# Patient Record
Sex: FEMALE | Race: BLACK OR AFRICAN AMERICAN | NOT HISPANIC OR LATINO | Employment: FULL TIME | URBAN - METROPOLITAN AREA
[De-identification: names, ages, dates, MRNs, and addresses within clinical notes are randomized per-mention and may not be internally consistent; named-entity substitution may affect disease eponyms.]

---

## 2023-01-19 ENCOUNTER — HOSPITAL ENCOUNTER (EMERGENCY)
Facility: HOSPITAL | Age: 53
Discharge: HOME | End: 2023-01-19
Attending: EMERGENCY MEDICINE | Admitting: EMERGENCY MEDICINE
Payer: COMMERCIAL

## 2023-01-19 ENCOUNTER — APPOINTMENT (OUTPATIENT)
Dept: CT IMAGING | Facility: HOSPITAL | Age: 53
End: 2023-01-19
Payer: COMMERCIAL

## 2023-01-19 VITALS
HEART RATE: 62 BPM | DIASTOLIC BLOOD PRESSURE: 88 MMHG | OXYGEN SATURATION: 97 % | RESPIRATION RATE: 16 BRPM | HEIGHT: 62 IN | SYSTOLIC BLOOD PRESSURE: 151 MMHG | BODY MASS INDEX: 29.44 KG/M2 | WEIGHT: 160 LBS | TEMPERATURE: 98.1 F

## 2023-01-19 DIAGNOSIS — N83.202 LEFT OVARIAN CYST: Primary | ICD-10-CM

## 2023-01-19 LAB
ANION GAP SERPL CALCULATED.3IONS-SCNC: 10.2 MMOL/L (ref 5–15)
BILIRUB UR QL STRIP: NEGATIVE
BUN SERPL-MCNC: 8 MG/DL (ref 6–20)
BUN/CREAT SERPL: 9.6 (ref 7–25)
CALCIUM SPEC-SCNC: 9.8 MG/DL (ref 8.6–10.5)
CHLORIDE SERPL-SCNC: 100 MMOL/L (ref 98–107)
CLARITY UR: CLEAR
CO2 SERPL-SCNC: 26.8 MMOL/L (ref 22–29)
COLOR UR: YELLOW
CREAT SERPL-MCNC: 0.83 MG/DL (ref 0.57–1)
DEPRECATED RDW RBC AUTO: 36.8 FL (ref 37–54)
EGFRCR SERPLBLD CKD-EPI 2021: 83.9 ML/MIN/1.73
ERYTHROCYTE [DISTWIDTH] IN BLOOD BY AUTOMATED COUNT: 12.6 % (ref 12.3–15.4)
GLUCOSE SERPL-MCNC: 92 MG/DL (ref 65–99)
GLUCOSE UR STRIP-MCNC: NEGATIVE MG/DL
HCT VFR BLD AUTO: 40.6 % (ref 34–46.6)
HGB BLD-MCNC: 14.5 G/DL (ref 12–15.9)
HGB UR QL STRIP.AUTO: NEGATIVE
KETONES UR QL STRIP: NEGATIVE
LEUKOCYTE ESTERASE UR QL STRIP.AUTO: NEGATIVE
MCH RBC QN AUTO: 28.4 PG (ref 26.6–33)
MCHC RBC AUTO-ENTMCNC: 35.7 G/DL (ref 31.5–35.7)
MCV RBC AUTO: 79.5 FL (ref 79–97)
NITRITE UR QL STRIP: NEGATIVE
PH UR STRIP.AUTO: 5.5 [PH] (ref 5–8)
PLATELET # BLD AUTO: 247 10*3/MM3 (ref 140–450)
PMV BLD AUTO: 10.2 FL (ref 6–12)
POTASSIUM SERPL-SCNC: 3.6 MMOL/L (ref 3.5–5.2)
PROT UR QL STRIP: NEGATIVE
RBC # BLD AUTO: 5.11 10*6/MM3 (ref 3.77–5.28)
SODIUM SERPL-SCNC: 137 MMOL/L (ref 136–145)
SP GR UR STRIP: 1.02 (ref 1–1.03)
UROBILINOGEN UR QL STRIP: NORMAL
WBC NRBC COR # BLD: 10.42 10*3/MM3 (ref 3.4–10.8)

## 2023-01-19 PROCEDURE — 85025 COMPLETE CBC W/AUTO DIFF WBC: CPT | Performed by: NURSE PRACTITIONER

## 2023-01-19 PROCEDURE — 81003 URINALYSIS AUTO W/O SCOPE: CPT | Performed by: EMERGENCY MEDICINE

## 2023-01-19 PROCEDURE — 85007 BL SMEAR W/DIFF WBC COUNT: CPT | Performed by: NURSE PRACTITIONER

## 2023-01-19 PROCEDURE — 99283 EMERGENCY DEPT VISIT LOW MDM: CPT | Performed by: NURSE PRACTITIONER

## 2023-01-19 PROCEDURE — 80048 BASIC METABOLIC PNL TOTAL CA: CPT | Performed by: NURSE PRACTITIONER

## 2023-01-19 PROCEDURE — 99283 EMERGENCY DEPT VISIT LOW MDM: CPT

## 2023-01-19 PROCEDURE — 74176 CT ABD & PELVIS W/O CONTRAST: CPT

## 2023-01-19 RX ORDER — HYDROCODONE BITARTRATE AND ACETAMINOPHEN 7.5; 325 MG/1; MG/1
1 TABLET ORAL EVERY 6 HOURS PRN
Qty: 10 TABLET | Refills: 0 | Status: SHIPPED | OUTPATIENT
Start: 2023-01-19

## 2023-01-19 RX ORDER — SODIUM CHLORIDE 0.9 % (FLUSH) 0.9 %
10 SYRINGE (ML) INJECTION AS NEEDED
Status: DISCONTINUED | OUTPATIENT
Start: 2023-01-19 | End: 2023-01-20 | Stop reason: HOSPADM

## 2023-01-19 RX ORDER — HYDROCHLOROTHIAZIDE 25 MG/1
25 TABLET ORAL DAILY
COMMUNITY

## 2023-01-19 RX ORDER — HYDROCODONE BITARTRATE AND ACETAMINOPHEN 7.5; 325 MG/1; MG/1
1 TABLET ORAL ONCE
Status: COMPLETED | OUTPATIENT
Start: 2023-01-19 | End: 2023-01-19

## 2023-01-19 RX ADMIN — HYDROCODONE BITARTRATE AND ACETAMINOPHEN 1 TABLET: 7.5; 325 TABLET ORAL at 22:08

## 2023-01-20 LAB
EOSINOPHIL # BLD MANUAL: 0.31 10*3/MM3 (ref 0–0.4)
EOSINOPHIL NFR BLD MANUAL: 3 % (ref 0.3–6.2)
LYMPHOCYTES # BLD MANUAL: 5.31 10*3/MM3 (ref 0.7–3.1)
LYMPHOCYTES NFR BLD MANUAL: 10 % (ref 5–12)
MONOCYTES # BLD: 1.04 10*3/MM3 (ref 0.1–0.9)
NEUTROPHILS # BLD AUTO: 3.75 10*3/MM3 (ref 1.7–7)
NEUTROPHILS NFR BLD MANUAL: 36 % (ref 42.7–76)
PLAT MORPH BLD: NORMAL
RBC MORPH BLD: NORMAL
VARIANT LYMPHS NFR BLD MANUAL: 51 % (ref 19.6–45.3)
WBC MORPH BLD: NORMAL

## 2023-01-20 NOTE — FSED PROVIDER NOTE
"EMERGENCY DEPARTMENT ENCOUNTER    Room Number:  10/10  Date seen:  1/19/2023  Time seen: 19:50 EST  PCP: Shayla Haney MD  Historian: pt,     HPI:  Chief complaint:\"I'm pretty sure I have a UTI\"  A complete HPI/ROS/PMH/PSH/SH/FH are unobtainable due to: n/a   Context:Lucia Fernando is a 52 y.o. female with history of UC and Crohns who presents to the ED with c/o 7-10 days of pressure in her lower abdomen with foul smelling and discolored urine.  She has associated urgency and frequency. Denies vomiting, fever/chills or blood in urine. She has not done anything at home for her symptoms.  States she probably doesn't drink enough water. She states this pain is not like her usual crohns or UC flare up.       The patient was placed in a mask in triage, hand hygiene was performed before and after my interaction with the patient.  I wore a mask, safety glasses and gloves during my entire interaction with the patient.    MEDICAL RECORD REVIEW    ALLERGIES  Patient has no known allergies.    PAST MEDICAL HISTORY  Active Ambulatory Problems     Diagnosis Date Noted   • No Active Ambulatory Problems     Resolved Ambulatory Problems     Diagnosis Date Noted   • No Resolved Ambulatory Problems     Past Medical History:   Diagnosis Date   • Colitis    • Crohn disease (HCC)    • Hypertension        PAST SURGICAL HISTORY  Past Surgical History:   Procedure Laterality Date   • COLONOSCOPY     • CYSTOSCOPY BLADDER STONE LITHOTRIPSY     • HYSTERECTOMY     • LAPAROSCOPIC TUBAL LIGATION         FAMILY HISTORY  No family history on file.    SOCIAL HISTORY  Social History     Socioeconomic History   • Marital status:        REVIEW OF SYSTEMS  Review of Systems    All systems reviewed and negative except for those discussed in HPI.     PHYSICAL EXAM    ED Triage Vitals [01/19/23 1914]   Temp Heart Rate Resp BP SpO2   98.1 °F (36.7 °C) 71 16 117/82 98 %      Temp src Heart Rate Source Patient Position BP Location FiO2 " (%)   Oral -- -- -- --     Physical Exam      I have reviewed the triage vital signs and nursing notes.      GENERAL: not distressed  HENT: nares patent  EYES: no scleral icterus  NECK: no ROM limitations  CV: regular rhythm, regular rate, no murmur  RESPIRATORY: normal effort, CTAB  ABDOMEN: soft, mild to moderate lower abdominal tenderness  : deferred  MUSCULOSKELETAL: no deformity  NEURO: alert, moves all extremities, follows commands  SKIN: warm, dry    LAB RESULTS  Recent Results (from the past 24 hour(s))   Urinalysis without microscopic (no culture) - Urine, Clean Catch    Collection Time: 01/19/23  7:51 PM    Specimen: Urine, Clean Catch   Result Value Ref Range    Color, UA Yellow Yellow, Straw    Appearance, UA Clear Clear    pH, UA 5.5 5.0 - 8.0    Specific Gravity, UA 1.025 1.005 - 1.030    Glucose, UA Negative Negative    Ketones, UA Negative Negative    Bilirubin, UA Negative Negative    Blood, UA Negative Negative    Protein, UA Negative Negative    Leuk Esterase, UA Negative Negative    Nitrite, UA Negative Negative    Urobilinogen, UA 0.2 E.U./dL 0.2 - 1.0 E.U./dL   Basic Metabolic Panel    Collection Time: 01/19/23  8:42 PM    Specimen: Blood   Result Value Ref Range    Glucose 92 65 - 99 mg/dL    BUN 8 6 - 20 mg/dL    Creatinine 0.83 0.57 - 1.00 mg/dL    Sodium 137 136 - 145 mmol/L    Potassium 3.6 3.5 - 5.2 mmol/L    Chloride 100 98 - 107 mmol/L    CO2 26.8 22.0 - 29.0 mmol/L    Calcium 9.8 8.6 - 10.5 mg/dL    BUN/Creatinine Ratio 9.6 7.0 - 25.0    Anion Gap 10.2 5.0 - 15.0 mmol/L    eGFR 83.9 >60.0 mL/min/1.73   CBC Auto Differential    Collection Time: 01/19/23  8:42 PM    Specimen: Blood   Result Value Ref Range    WBC 10.42 3.40 - 10.80 10*3/mm3    RBC 5.11 3.77 - 5.28 10*6/mm3    Hemoglobin 14.5 12.0 - 15.9 g/dL    Hematocrit 40.6 34.0 - 46.6 %    MCV 79.5 79.0 - 97.0 fL    MCH 28.4 26.6 - 33.0 pg    MCHC 35.7 31.5 - 35.7 g/dL    RDW 12.6 12.3 - 15.4 %    RDW-SD 36.8 (L) 37.0 - 54.0 fl     MPV 10.2 6.0 - 12.0 fL    Platelets 247 140 - 450 10*3/mm3         RADIOLOGY RESULTS  CT Abdomen Pelvis Without Contrast    Result Date: 1/19/2023  CT ABDOMEN PELVIS WO CONTRAST Date of Exam: 1/19/2023 8:43 PM EST Indication: abd pain, crohns,. Comparison: 4/24/2015 and prior Technique: Axial CT images were obtained of the abdomen and pelvis without the administration of contrast. Sagittal and coronal reconstructions were performed.  Automated exposure control and iterative reconstruction methods were used. FINDINGS: Abdomen/Pelvis: Lower Chest: Limited imaging of the lung bases is grossly clear. No free air noted below the diaphragm Organs: Limited noncontrast imaging of the liver, gallbladder, spleen, pancreas, kidneys and adrenal glands are unremarkable GI/Bowel: Limited noncontrast imaging of the stomach and small bowel are unremarkable. No suspicious mesenteric adenopathy or fluid collection The ileocecal valve is unremarkable. Patient appears to be status post hysterectomy. The colon shows no acute abnormality Pelvis: 4 cm cyst noted within the left ovary. Right ovary appears unremarkable. Patient is status post hysterectomy. The bladder is unremarkable Peritoneum/Retroperitoneum: The aorta is normal in caliber. There is no suspicious retroperitoneal adenopathy Bones/Soft Tissues: No destructive bone lesion.     4 cm cyst within the left ovary. If patient has pelvic pain follow-up pelvic ultrasound can always be considered Otherwise unremarkable limited noncontrast CT of the abdomen and pelvis Electronically Signed: Kush Romo  1/19/2023 9:38 PM EST  Workstation ID: [id number]         PROGRESS, DATA ANALYSIS, CONSULTS AND MEDICAL DECISION MAKING  All labs have been independently reviewed by me.  All radiology studies have been reviewed by me and discussed with radiologist dictating the report.  EKG's independently viewed and interpreted by me unless stated otherwise. Discussion below represents my analysis  of pertinent findings related to patient's condition, differential diagnosis, treatment plan and final disposition.       Social Determinants of Health     Financial Resource Strain: Not on file   Food Insecurity: Not on file   Transportation Needs: Not on file   Physical Activity: Not on file   Stress: Not on file   Social Connections: Not on file   Intimate Partner Violence: Not on file   Housing Stability: Not on file       Orders placed during this visit:  Orders Placed This Encounter   Procedures   • CT Abdomen Pelvis Without Contrast   • Urinalysis without microscopic (no culture) - Urine, Clean Catch   • Basic Metabolic Panel   • CBC Auto Differential   • Manual Differential   • Insert peripheral IV   • Blood Draw With IV Start   • CBC & Differential   • ED Acknowledgement Form Needed;     Differential diagnosis includes but is not limited to:  - hepatobiliary pathology such as cholecystitis, cholangitis, and symptomatic cholelithiasis  - Pancreatitis  - Dyspepsia  - Small bowel obstruction  - Appendicitis  - Diverticulitis  - UTI including pyelonephritis  - Ureteral stone  - Zoster  - Colitis, including infectious and ischemic  -crohns flare      ED Course as of 01/19/23 2146   Thu Jan 19, 2023 2029 Pt's urine does not appear infected.  Will  have her follow up with Primary Care Provider.  [EW]   2122 Pt's labs are unremarkable.  Urine not infected.  [EW]   2127 Due to her presence of pain and not feeling well for 10 days and urine not being abnormal I discussed getting CT scan to look at abdomen.  She has h/o crohns and UC.  [EW]   2145 Inspect Report verified.  [EW]      ED Course User Index  [EW] Margie Mandujano, JIM       Medical Decision Making  Pt's labs and urine are normal.  CT shows left ovarian cyst.  I have given her very short course of opiates to use as needed.  She will call her OB/GYN tomorrow.  She is not toxic appearing.     Left ovarian cyst: acute illness or injury  Amount and/or  Complexity of Data Reviewed  Labs: ordered.  Radiology: ordered.      Risk  Prescription drug management.          DIAGNOSIS  Final diagnoses:   Left ovarian cyst       FOLLOW-UP  Shayla Haney MD  [address] [facility]  [place] [address] [place] [address] [place] [facility]  [phone]      and call your OB/GYN tomorrow for sooner follow up        Latest Documented Vital Signs:  As of 21:46 EST  BP- 117/82 HR- 71 Temp- 98.1 °F (36.7 °C) (Oral) O2 sat- 98%    Please note that portions of this were completed with a voice recognition program.     Note Disclaimer: At [facility], we believe that sharing information builds trust and better relationships. You are receiving this note because you are receiving care at [facility] or recently visited. It is possible you will see health information before a provider has talked with you about it. This kind of information can be easy to misunderstand. To help you fully understand what it means for your health, we urge you to discuss this note with your provider.

## 2023-01-20 NOTE — DISCHARGE INSTRUCTIONS
YOU HAVE BEEN PRESCRIBED NARCOTIC PAIN MEDICATION    Narcotic pain medications can be addicting; only take them when needed    You have only been given a 2-3 day supply    Do not operate heavy machinery or make important decisions while taking narcotics    Take an over the counter stool softener while taking pain pills to avoid constipation    Do not consume alcohol while taking pain medication as this can be fatal    Do not share your pain medication with others    Store pain medication securely to prevent accidental exposure or death    Return Precautions    Although you are being discharged from the ED today, I encourage you to return for worsening symptoms.  Things can, and do, change such that treatment at home with medication may not be adequate.      Specifically, return for any of the following: WORSENING PAIN, NAUSEA AND VOMITING    Chest pain, shortness of breath, pain or nausea and vomiting not controlled by medications provided.    Please make a follow up with your Primary Care Provider for a blood pressure recheck.

## 2023-05-31 ENCOUNTER — APPOINTMENT (OUTPATIENT)
Dept: GENERAL RADIOLOGY | Facility: HOSPITAL | Age: 53
End: 2023-05-31
Payer: COMMERCIAL

## 2023-05-31 ENCOUNTER — HOSPITAL ENCOUNTER (OUTPATIENT)
Facility: HOSPITAL | Age: 53
Setting detail: OBSERVATION
Discharge: HOME | End: 2023-06-01
Attending: EMERGENCY MEDICINE | Admitting: EMERGENCY MEDICINE
Payer: COMMERCIAL

## 2023-05-31 DIAGNOSIS — R55 NEAR SYNCOPE: ICD-10-CM

## 2023-05-31 DIAGNOSIS — T50.901A ACCIDENTAL DRUG INGESTION, INITIAL ENCOUNTER: Primary | ICD-10-CM

## 2023-05-31 LAB
ALBUMIN SERPL-MCNC: 4 G/DL (ref 3.5–5.2)
ALBUMIN/GLOB SERPL: 1.3 G/DL
ALP SERPL-CCNC: 62 U/L (ref 39–117)
ALT SERPL W P-5'-P-CCNC: 14 U/L (ref 1–33)
ANION GAP SERPL CALCULATED.3IONS-SCNC: 15 MMOL/L (ref 5–15)
AST SERPL-CCNC: 17 U/L (ref 1–32)
BASOPHILS # BLD AUTO: 0.1 10*3/MM3 (ref 0–0.2)
BASOPHILS NFR BLD AUTO: 0.9 % (ref 0–1.5)
BILIRUB SERPL-MCNC: 0.6 MG/DL (ref 0–1.2)
BUN SERPL-MCNC: 12 MG/DL (ref 6–20)
BUN/CREAT SERPL: 13.3 (ref 7–25)
CALCIUM SPEC-SCNC: 9.8 MG/DL (ref 8.6–10.5)
CHLORIDE SERPL-SCNC: 103 MMOL/L (ref 98–107)
CO2 SERPL-SCNC: 25 MMOL/L (ref 22–29)
CREAT SERPL-MCNC: 0.9 MG/DL (ref 0.57–1)
DEPRECATED RDW RBC AUTO: 42 FL (ref 37–54)
EGFRCR SERPLBLD CKD-EPI 2021: 75.7 ML/MIN/1.73
EOSINOPHIL # BLD AUTO: 0.1 10*3/MM3 (ref 0–0.4)
EOSINOPHIL NFR BLD AUTO: 0.4 % (ref 0.3–6.2)
ERYTHROCYTE [DISTWIDTH] IN BLOOD BY AUTOMATED COUNT: 13.9 % (ref 12.3–15.4)
GIANT PLATELETS: NORMAL
GLOBULIN UR ELPH-MCNC: 3.2 GM/DL
GLUCOSE SERPL-MCNC: 133 MG/DL (ref 65–99)
HCT VFR BLD AUTO: 41.8 % (ref 34–46.6)
HGB BLD-MCNC: 14.5 G/DL (ref 12–15.9)
HOLD SPECIMEN: NORMAL
HOLD SPECIMEN: NORMAL
LYMPHOCYTES # BLD AUTO: 5.3 10*3/MM3 (ref 0.7–3.1)
LYMPHOCYTES NFR BLD AUTO: 38.5 % (ref 19.6–45.3)
MCH RBC QN AUTO: 28.4 PG (ref 26.6–33)
MCHC RBC AUTO-ENTMCNC: 34.6 G/DL (ref 31.5–35.7)
MCV RBC AUTO: 82.3 FL (ref 79–97)
MONOCYTES # BLD AUTO: 0.9 10*3/MM3 (ref 0.1–0.9)
MONOCYTES NFR BLD AUTO: 6.3 % (ref 5–12)
NEUTROPHILS NFR BLD AUTO: 53.9 % (ref 42.7–76)
NEUTROPHILS NFR BLD AUTO: 7.5 10*3/MM3 (ref 1.7–7)
NRBC BLD AUTO-RTO: 0.4 /100 WBC (ref 0–0.2)
PLATELET # BLD AUTO: 175 10*3/MM3 (ref 140–450)
PMV BLD AUTO: 9.7 FL (ref 6–12)
POTASSIUM SERPL-SCNC: 3.1 MMOL/L (ref 3.5–5.2)
PROT SERPL-MCNC: 7.2 G/DL (ref 6–8.5)
RBC # BLD AUTO: 5.09 10*6/MM3 (ref 3.77–5.28)
RBC MORPH BLD: NORMAL
SMALL PLATELETS BLD QL SMEAR: ADEQUATE
SODIUM SERPL-SCNC: 143 MMOL/L (ref 136–145)
WBC MORPH BLD: NORMAL
WBC NRBC COR # BLD: 13.9 10*3/MM3 (ref 3.4–10.8)
WHOLE BLOOD HOLD COAG: NORMAL
WHOLE BLOOD HOLD SPECIMEN: NORMAL

## 2023-05-31 PROCEDURE — 96374 THER/PROPH/DIAG INJ IV PUSH: CPT

## 2023-05-31 PROCEDURE — G0378 HOSPITAL OBSERVATION PER HR: HCPCS

## 2023-05-31 PROCEDURE — 85007 BL SMEAR W/DIFF WBC COUNT: CPT | Performed by: NURSE PRACTITIONER

## 2023-05-31 PROCEDURE — 71045 X-RAY EXAM CHEST 1 VIEW: CPT

## 2023-05-31 PROCEDURE — 36415 COLL VENOUS BLD VENIPUNCTURE: CPT

## 2023-05-31 PROCEDURE — 99285 EMERGENCY DEPT VISIT HI MDM: CPT

## 2023-05-31 PROCEDURE — 25010000002 ONDANSETRON PER 1 MG: Performed by: NURSE PRACTITIONER

## 2023-05-31 PROCEDURE — 93005 ELECTROCARDIOGRAM TRACING: CPT | Performed by: EMERGENCY MEDICINE

## 2023-05-31 PROCEDURE — 85025 COMPLETE CBC W/AUTO DIFF WBC: CPT | Performed by: NURSE PRACTITIONER

## 2023-05-31 PROCEDURE — 80053 COMPREHEN METABOLIC PANEL: CPT | Performed by: NURSE PRACTITIONER

## 2023-05-31 RX ORDER — ONDANSETRON 2 MG/ML
4 INJECTION INTRAMUSCULAR; INTRAVENOUS ONCE
Status: COMPLETED | OUTPATIENT
Start: 2023-05-31 | End: 2023-05-31

## 2023-05-31 RX ORDER — SODIUM CHLORIDE 0.9 % (FLUSH) 0.9 %
10 SYRINGE (ML) INJECTION AS NEEDED
Status: DISCONTINUED | OUTPATIENT
Start: 2023-05-31 | End: 2023-06-01 | Stop reason: HOSPADM

## 2023-05-31 RX ADMIN — SODIUM CHLORIDE, POTASSIUM CHLORIDE, SODIUM LACTATE AND CALCIUM CHLORIDE 1000 ML: 600; 310; 30; 20 INJECTION, SOLUTION INTRAVENOUS at 19:26

## 2023-05-31 RX ADMIN — ONDANSETRON 4 MG: 2 INJECTION INTRAMUSCULAR; INTRAVENOUS at 19:38

## 2023-05-31 NOTE — ED PROVIDER NOTES
"Subjective   History of Present Illness  53-year-old female presents with spouse and mother at bedside for complaint of near syncope after taking her hydrochlorothiazide as well as her 's lisinopril and hydralazine by accident.  Patient stated \"he said his medicine bottles next to mine and I took them thinking they remind\".  She reports that she does not have relief with lying flat.  She denies history of heart failure.  Denies associated chest pain dyspnea.    Primary care: Dr. Haney        Review of Systems    Past Medical History:   Diagnosis Date   • Colitis    • Crohn disease    • Hypertension        No Known Allergies    Past Surgical History:   Procedure Laterality Date   • COLONOSCOPY     • CYSTOSCOPY BLADDER STONE LITHOTRIPSY     • HYSTERECTOMY     • LAPAROSCOPIC TUBAL LIGATION         History reviewed. No pertinent family history.    Social History     Socioeconomic History   • Marital status:            Objective   Physical Exam  Vitals and nursing note reviewed.   Constitutional:       General: She is awake. She is not in acute distress.     Appearance: Normal appearance. She is well-developed and normal weight. She is not ill-appearing, toxic-appearing or diaphoretic.   HENT:      Head: Normocephalic and atraumatic.   Neck:      Thyroid: No thyromegaly.      Vascular: No JVD.      Trachea: No tracheal deviation.   Cardiovascular:      Rate and Rhythm: Regular rhythm. Tachycardia present.      Pulses: Normal pulses.           Radial pulses are 2+ on the right side and 2+ on the left side.        Dorsalis pedis pulses are 2+ on the right side and 2+ on the left side.        Posterior tibial pulses are 2+ on the right side and 2+ on the left side.      Heart sounds: Normal heart sounds, S1 normal and S2 normal. Heart sounds not distant. No murmur heard.    No friction rub. No gallop.   Pulmonary:      Effort: Pulmonary effort is normal. No respiratory distress.      Breath sounds: Normal " breath sounds and air entry. No wheezing or rales.   Chest:      Chest wall: No tenderness.   Abdominal:      General: Bowel sounds are normal. There is no distension.      Palpations: Abdomen is soft. Abdomen is not rigid. There is no mass.      Tenderness: There is no abdominal tenderness. There is no guarding or rebound.      Hernia: No hernia is present.   Musculoskeletal:         General: No swelling or tenderness. Normal range of motion.      Cervical back: Normal range of motion and neck supple.      Right lower leg: No edema.      Left lower leg: No edema.   Skin:     General: Skin is warm and dry.      Capillary Refill: Capillary refill takes less than 2 seconds.      Coloration: Skin is pale.      Findings: No bruising or rash.   Neurological:      Mental Status: She is alert and oriented to person, place, and time.      GCS: GCS eye subscore is 4. GCS verbal subscore is 5. GCS motor subscore is 6.   Psychiatric:         Mood and Affect: Mood normal.         Behavior: Behavior normal. Behavior is cooperative.         Thought Content: Thought content normal.         Judgment: Judgment normal.         ECG 12 Lead      Date/Time: 5/31/2023 9:23 PM  Performed by: Xiao Grider APRN  Authorized by: Harpal Hernandez MD   Interpreted by physician  Comparison: compared with previous ECG from 11/16/2015  Similar to previous ECG  Comparison to previous ECG: Sinus rhythm rate 58  Rhythm: sinus rhythm  Rhythm comments: Rate 85  ST Segments: ST segments normal  T Waves: T waves normal  Other findings: prolonged QTc interval  Other findings comments: 524  Clinical impression: abnormal ECG  Comments: Rate increased, prolonged QT interval now present                 ED Course                                           Medical Decision Making  Accidental drug ingestion, initial encounter: acute illness or injury  Near syncope: acute illness or injury  Amount and/or Complexity of Data Reviewed  Labs: ordered.  "Decision-making details documented in ED Course.  Radiology: ordered. Decision-making details documented in ED Course.  ECG/medicine tests: ordered. Decision-making details documented in ED Course.      Risk  Prescription drug management.  Decision regarding hospitalization.        Interpreted by radiologist as below:     XR Chest 1 View    Result Date: 5/31/2023  1.Left basilar opacities may represent atelectasis or infiltrates. Electronically Signed: Govind Blackwood  5/31/2023 8:22 PM EDT  Workstation ID: [id number]        /69 (Patient Position: Standing)   Pulse 100   Temp 98.1 °F (36.7 °C) (Oral)   Resp 16   Ht 157.5 cm (62\")   Wt 72.6 kg (160 lb)   SpO2 99%   BMI 29.26 kg/m²      Lab Results (last 24 hours)     Procedure Component Value Units Date/Time    CBC & Differential [[id number]]  (Abnormal) Collected: 05/31/23 1853    Specimen: Blood from Arm, Left Updated: 05/31/23 2038    Narrative:      The following orders were created for panel order CBC & Differential.  Procedure                               Abnormality         Status                     ---------                               -----------         ------                     CBC Auto Differential[[id number]]        Abnormal            Final result               Scan Slide[[id number]]                                       Final result                 Please view results for these tests on the individual orders.    Comprehensive Metabolic Panel [[id number]]  (Abnormal) Collected: 05/31/23 1853    Specimen: Blood from Arm, Left Updated: 05/31/23 1945     Glucose 133 mg/dL      BUN 12 mg/dL      Creatinine 0.90 mg/dL      Sodium 143 mmol/L      Potassium 3.1 mmol/L      Chloride 103 mmol/L      CO2 25.0 mmol/L      Calcium 9.8 mg/dL      Total Protein 7.2 g/dL      Albumin 4.0 g/dL      ALT (SGPT) 14 U/L      AST (SGOT) 17 U/L      Alkaline Phosphatase 62 U/L      Total Bilirubin 0.6 mg/dL      Globulin 3.2 gm/dL      A/G Ratio 1.3 g/dL      " BUN/Creatinine Ratio 13.3     Anion Gap 15.0 mmol/L      eGFR 75.7 mL/min/1.73     Narrative:      GFR Normal >60  Chronic Kidney Disease <60  Kidney Failure <15      CBC Auto Differential [[id number]]  (Abnormal) Collected: 05/31/23 1853    Specimen: Blood from Arm, Left Updated: 05/31/23 2038     WBC 13.90 10*3/mm3      RBC 5.09 10*6/mm3      Hemoglobin 14.5 g/dL      Hematocrit 41.8 %      MCV 82.3 fL      MCH 28.4 pg      MCHC 34.6 g/dL      RDW 13.9 %      RDW-SD 42.0 fl      MPV 9.7 fL      Platelets 175 10*3/mm3      Neutrophil % 53.9 %      Lymphocyte % 38.5 %      Monocyte % 6.3 %      Eosinophil % 0.4 %      Basophil % 0.9 %      Neutrophils, Absolute 7.50 10*3/mm3      Lymphocytes, Absolute 5.30 10*3/mm3      Monocytes, Absolute 0.90 10*3/mm3      Eosinophils, Absolute 0.10 10*3/mm3      Basophils, Absolute 0.10 10*3/mm3      nRBC 0.4 /100 WBC     Scan Slide [[id number]] Collected: 05/31/23 1853    Specimen: Blood from Arm, Left Updated: 05/31/23 2038     RBC Morphology Normal     WBC Morphology Normal     Platelet Estimate Adequate     Giant Platelets Mod/2+           Medications   sodium chloride 0.9 % flush 10 mL (has no administration in time range)   lactated ringers bolus 1,000 mL (1,000 mL Intravenous New Bag 5/31/23 1926)   ondansetron (ZOFRAN) injection 4 mg (4 mg Intravenous Given 5/31/23 1938)        Patient undressed and placed in gown for exam.  Appropriate PPE worn during patient exam.  Appropriate monitoring initiated. Patient is alert and oriented x3. No acute distress.  S1-S2 heart sounds on exam.  Lungs are clear to auscultation. No edema noted to the bilateral lower extremities.  IV established and labs obtained.  Cardiac work-up initiated.  Chest x-ray obtained with the above findings. Patient had near syncope and nausea with standing while doing orthostatic vital signs.  Patient was given a lactated Ringer's 1 L bolus.  Her pressure improved from 89 systolic to 109.  CBC CMP essentially  unremarkable.  Chest x-ray shows no acute abnormalities, left basilar atelectasis noted.  There is a concern because she had taken her 's lisinopril which has a half-life of 12 hours.  She ingested the medication at 1630 spoke with my attending, Dr. Hernandez who is agreeable that patient should be placed in ED observation for monitoring blood pressure and IV hydration.    I reviewed chart 1/19/2023 patient was seen at [facility] and found to have a left ovarian cyst. My radiology interpretation left basilar atelectasis, otherwise no cardiomegaly, pulmonary edema, infiltrates    Disposition/Treatment: Discussed results with patient, verbalized understanding. Agreeable with plan of care. Patient was stable upon being placed in ED observation unit.    Upon reassessment, patient is flesh tone warm and dry no acute distress noted.  Vital signs are stable.    Part of this note may be an electronic transcription/translation of spoken language to printed text using the Dragon Dictation System.     Final diagnoses:   Accidental drug ingestion, initial encounter   Near syncope       ED Disposition  ED Disposition     ED Disposition   Decision to Admit    Condition   --    Comment   --             No follow-up provider specified.       Medication List      No changes were made to your prescriptions during this visit.          Xiao Grider, APRN  05/31/23 [id number]

## 2023-05-31 NOTE — ED NOTES
"Pt reports accidentally taking her 's blood pressure medication(s). He takes Hydralazine 100 mg and Lisinopril 40 mg. She is unsure if she took one or both of the pills. Pt reports taking these at approx 1730. Pt started feeling very lightheaded and dizzy 30 min's ago and realized what she had done. EMS was called and pt brought to ED. Pt denies any dizziness or lightheadedness, but feels a bit \"spacey.\" Pt and family laughing at the incident since pt is asymptomatic other than unusually low b/p.   "

## 2023-06-01 VITALS
HEIGHT: 62 IN | WEIGHT: 160 LBS | SYSTOLIC BLOOD PRESSURE: 114 MMHG | BODY MASS INDEX: 29.44 KG/M2 | RESPIRATION RATE: 16 BRPM | DIASTOLIC BLOOD PRESSURE: 76 MMHG | OXYGEN SATURATION: 96 % | HEART RATE: 102 BPM | TEMPERATURE: 98.6 F

## 2023-06-01 LAB
ANION GAP SERPL CALCULATED.3IONS-SCNC: 11 MMOL/L (ref 5–15)
BASOPHILS # BLD AUTO: 0 10*3/MM3 (ref 0–0.2)
BASOPHILS NFR BLD AUTO: 0.3 % (ref 0–1.5)
BUN SERPL-MCNC: 10 MG/DL (ref 6–20)
BUN/CREAT SERPL: 13.9 (ref 7–25)
CALCIUM SPEC-SCNC: 9.5 MG/DL (ref 8.6–10.5)
CHLORIDE SERPL-SCNC: 105 MMOL/L (ref 98–107)
CO2 SERPL-SCNC: 25 MMOL/L (ref 22–29)
CREAT SERPL-MCNC: 0.72 MG/DL (ref 0.57–1)
DEPRECATED RDW RBC AUTO: 42.4 FL (ref 37–54)
EGFRCR SERPLBLD CKD-EPI 2021: 98.9 ML/MIN/1.73
EOSINOPHIL # BLD AUTO: 0 10*3/MM3 (ref 0–0.4)
EOSINOPHIL NFR BLD AUTO: 0.1 % (ref 0.3–6.2)
ERYTHROCYTE [DISTWIDTH] IN BLOOD BY AUTOMATED COUNT: 14 % (ref 12.3–15.4)
GLUCOSE SERPL-MCNC: 116 MG/DL (ref 65–99)
HCT VFR BLD AUTO: 39.2 % (ref 34–46.6)
HGB BLD-MCNC: 13.7 G/DL (ref 12–15.9)
LYMPHOCYTES # BLD AUTO: 3.2 10*3/MM3 (ref 0.7–3.1)
LYMPHOCYTES NFR BLD AUTO: 28 % (ref 19.6–45.3)
MCH RBC QN AUTO: 28.6 PG (ref 26.6–33)
MCHC RBC AUTO-ENTMCNC: 34.9 G/DL (ref 31.5–35.7)
MCV RBC AUTO: 81.9 FL (ref 79–97)
MONOCYTES # BLD AUTO: 0.9 10*3/MM3 (ref 0.1–0.9)
MONOCYTES NFR BLD AUTO: 7.5 % (ref 5–12)
NEUTROPHILS NFR BLD AUTO: 64.1 % (ref 42.7–76)
NEUTROPHILS NFR BLD AUTO: 7.2 10*3/MM3 (ref 1.7–7)
NRBC BLD AUTO-RTO: 0.4 /100 WBC (ref 0–0.2)
PLATELET # BLD AUTO: 219 10*3/MM3 (ref 140–450)
PMV BLD AUTO: 9 FL (ref 6–12)
POTASSIUM SERPL-SCNC: 3.6 MMOL/L (ref 3.5–5.2)
QT INTERVAL: 440 MS
RBC # BLD AUTO: 4.79 10*6/MM3 (ref 3.77–5.28)
SODIUM SERPL-SCNC: 141 MMOL/L (ref 136–145)
WBC NRBC COR # BLD: 11.3 10*3/MM3 (ref 3.4–10.8)

## 2023-06-01 PROCEDURE — G0378 HOSPITAL OBSERVATION PER HR: HCPCS

## 2023-06-01 PROCEDURE — 85025 COMPLETE CBC W/AUTO DIFF WBC: CPT | Performed by: EMERGENCY MEDICINE

## 2023-06-01 PROCEDURE — 93005 ELECTROCARDIOGRAM TRACING: CPT | Performed by: PHYSICIAN ASSISTANT

## 2023-06-01 PROCEDURE — 93010 ELECTROCARDIOGRAM REPORT: CPT | Performed by: INTERNAL MEDICINE

## 2023-06-01 PROCEDURE — 80048 BASIC METABOLIC PNL TOTAL CA: CPT | Performed by: EMERGENCY MEDICINE

## 2023-06-01 RX ORDER — SUMATRIPTAN 25 MG/1
25 TABLET, FILM COATED ORAL ONCE
Status: COMPLETED | OUTPATIENT
Start: 2023-06-01 | End: 2023-06-01

## 2023-06-01 RX ORDER — DESVENLAFAXINE SUCCINATE 50 MG/1
50 TABLET, EXTENDED RELEASE ORAL DAILY
Status: DISCONTINUED | OUTPATIENT
Start: 2023-06-01 | End: 2023-06-01 | Stop reason: HOSPADM

## 2023-06-01 RX ORDER — AMITRIPTYLINE HYDROCHLORIDE 10 MG/1
10 TABLET, FILM COATED ORAL NIGHTLY
COMMUNITY

## 2023-06-01 RX ORDER — AMITRIPTYLINE HYDROCHLORIDE 10 MG/1
10 TABLET, FILM COATED ORAL ONCE
Status: COMPLETED | OUTPATIENT
Start: 2023-06-01 | End: 2023-06-01

## 2023-06-01 RX ORDER — SUMATRIPTAN 25 MG/1
25 TABLET, FILM COATED ORAL
COMMUNITY

## 2023-06-01 RX ORDER — POTASSIUM CHLORIDE 750 MG/1
10 TABLET, FILM COATED, EXTENDED RELEASE ORAL 2 TIMES DAILY
COMMUNITY

## 2023-06-01 RX ORDER — BUTALBITAL, ACETAMINOPHEN AND CAFFEINE 50; 325; 40 MG/1; MG/1; MG/1
1 TABLET ORAL EVERY 4 HOURS PRN
Status: DISCONTINUED | OUTPATIENT
Start: 2023-06-01 | End: 2023-06-01 | Stop reason: HOSPADM

## 2023-06-01 RX ORDER — DESVENLAFAXINE SUCCINATE 50 MG/1
50 TABLET, EXTENDED RELEASE ORAL DAILY
COMMUNITY

## 2023-06-01 RX ORDER — POTASSIUM CHLORIDE 750 MG/1
10 TABLET, FILM COATED, EXTENDED RELEASE ORAL ONCE
Status: COMPLETED | OUTPATIENT
Start: 2023-06-01 | End: 2023-06-01

## 2023-06-01 RX ORDER — POTASSIUM CHLORIDE 750 MG/1
10 TABLET, FILM COATED, EXTENDED RELEASE ORAL 2 TIMES DAILY WITH MEALS
Status: DISCONTINUED | OUTPATIENT
Start: 2023-06-01 | End: 2023-06-01

## 2023-06-01 RX ORDER — POTASSIUM CHLORIDE 20 MEQ/1
20 TABLET, EXTENDED RELEASE ORAL 2 TIMES DAILY WITH MEALS
Status: DISCONTINUED | OUTPATIENT
Start: 2023-06-01 | End: 2023-06-01 | Stop reason: HOSPADM

## 2023-06-01 RX ORDER — AMITRIPTYLINE HYDROCHLORIDE 10 MG/1
10 TABLET, FILM COATED ORAL NIGHTLY
Status: DISCONTINUED | OUTPATIENT
Start: 2023-06-01 | End: 2023-06-01 | Stop reason: HOSPADM

## 2023-06-01 RX ORDER — VIBEGRON 75 MG/1
75 TABLET, FILM COATED ORAL DAILY
COMMUNITY

## 2023-06-01 RX ADMIN — BUTALBITAL, ACETAMINOPHEN, AND CAFFEINE 1 TABLET: 50; 325; 40 TABLET ORAL at 07:00

## 2023-06-01 RX ADMIN — AMITRIPTYLINE HYDROCHLORIDE 10 MG: 10 TABLET, FILM COATED ORAL at 01:29

## 2023-06-01 RX ADMIN — POTASSIUM CHLORIDE 10 MEQ: 750 TABLET, EXTENDED RELEASE ORAL at 02:02

## 2023-06-01 RX ADMIN — Medication 10 ML: at 01:30

## 2023-06-01 RX ADMIN — POTASSIUM CHLORIDE 20 MEQ: 1500 TABLET, EXTENDED RELEASE ORAL at 08:15

## 2023-06-01 RX ADMIN — DESVENLAFAXINE SUCCINATE 50 MG: 50 TABLET, EXTENDED RELEASE ORAL at 08:15

## 2023-06-01 RX ADMIN — POTASSIUM CHLORIDE 10 MEQ: 750 TABLET, EXTENDED RELEASE ORAL at 01:29

## 2023-06-01 RX ADMIN — SUMATRIPTAN SUCCINATE 25 MG: 25 TABLET ORAL at 01:29

## 2023-06-01 NOTE — DISCHARGE SUMMARY
"[facility]    Shayla Haney MD    CHIEF COMPLAINT:     Accidental overdose    HISTORY OF PRESENT ILLNESS:    Obtained from ED provider HPI on 5/31/2023:  53-year-old female presents with spouse and mother at bedside for complaint of near syncope after taking her hydrochlorothiazide as well as her 's lisinopril and hydralazine by accident.  Patient stated \"he said his medicine bottles next to mine and I took them thinking they remind\".  She reports that she does not have relief with lying flat.  She denies history of heart failure.  Denies associated chest pain dyspnea.    06/01/23:  Patient confirms HPI noted above including accidentally taking the wrong blood pressure medication with some subsequent lightheadedness and hypotension noted in the ED.  She has done generally well following her admission though she does report a headache on the morning following.  No other new or acute complaints are noted          Past Medical History:   Diagnosis Date   • Colitis    • Crohn disease    • Hypertension      Past Surgical History:   Procedure Laterality Date   • COLONOSCOPY     • CYSTOSCOPY BLADDER STONE LITHOTRIPSY     • HYSTERECTOMY     • LAPAROSCOPIC TUBAL LIGATION       History reviewed. No pertinent family history.  Social History     Tobacco Use   • Smoking status: Former     Packs/day: 1.00     Years: 15.00     Pack years: 15.00     Types: Cigarettes   • Smokeless tobacco: Never   Vaping Use   • Vaping Use: Unknown   Substance Use Topics   • Alcohol use: Not Currently   • Drug use: Defer     Medications Prior to Admission   Medication Sig Dispense Refill Last Dose   • amitriptyline (ELAVIL) 10 MG tablet Take 1 tablet by mouth Every Night.      • desvenlafaxine (PRISTIQ) 50 MG 24 hr tablet Take 1 tablet by mouth Daily.      • hydroCHLOROthiazide (HYDRODIURIL) 25 MG tablet Take 25 mg by mouth Daily.      • HYDROcodone-acetaminophen (NORCO) 7.5-325 MG per tablet Take 1 tablet by " mouth Every 6 (Six) Hours As Needed for Moderate Pain. 10 tablet 0    • potassium chloride 10 MEQ CR tablet Take 1 tablet by mouth 2 (Two) Times a Day.      • SUMAtriptan (IMITREX) 25 MG tablet Take 1 tablet by mouth Every 2 (Two) Hours As Needed for Migraine. Take one tablet at onset of headache. May repeat dose one time in 2 hours if headache not relieved.      • Vibegron (Gemtesa) 75 MG tablet Take 1 tablet by mouth Daily.        Allergies:  Patient has no known allergies.    Immunization History   Administered Date(s) Administered   • COVID-19 ([employer]) 03/13/2021           REVIEW OF SYSTEMS:    Review of Systems   Constitutional: Negative.   HENT: Negative.    Eyes: Negative.    Cardiovascular: Positive for near-syncope.   Respiratory: Negative.    Skin: Negative.    Musculoskeletal: Negative.    Gastrointestinal: Negative.    Genitourinary: Negative.    Neurological: Positive for dizziness.   Psychiatric/Behavioral: Negative.          Vital Signs  Temp:  [98.1 °F (36.7 °C)-99.3 °F (37.4 °C)] 98.6 °F (37 °C)  Heart Rate:  [] 102  Resp:  [15-16] 16  BP: ()/(43-78) 114/76          Physical Exam:  Physical Exam  Vitals reviewed.   Constitutional:       General: She is not in acute distress.     Appearance: Normal appearance. She is normal weight. She is not ill-appearing, toxic-appearing or diaphoretic.   HENT:      Head: Normocephalic.      Right Ear: External ear normal.      Left Ear: External ear normal.      Nose: Nose normal.      Mouth/Throat:      Mouth: Mucous membranes are moist.   Eyes:      Extraocular Movements: Extraocular movements intact.   Cardiovascular:      Rate and Rhythm: Normal rate and regular rhythm.      Pulses: Normal pulses.   Pulmonary:      Effort: Pulmonary effort is normal.      Breath sounds: Normal breath sounds.   Abdominal:      General: Bowel sounds are normal.      Palpations: Abdomen is soft.   Musculoskeletal:      Cervical back: Normal range of motion.       Right lower leg: No edema.      Left lower leg: No edema.   Skin:     General: Skin is warm and dry.      Capillary Refill: Capillary refill takes less than 2 seconds.   Neurological:      General: No focal deficit present.      Mental Status: She is alert.   Psychiatric:         Mood and Affect: Mood normal.         Behavior: Behavior normal.         Thought Content: Thought content normal.         Judgment: Judgment normal.           Emotional Behavior:   Normal   Debilities:  None  Results Review:    I reviewed the patient's new clinical results.  Lab Results (most recent)     Procedure Component Value Units Date/Time    Basic Metabolic Panel [[id number]]  (Abnormal) Collected: 06/01/23 0728    Specimen: Blood from Arm, Left Updated: 06/01/23 0821     Glucose 116 mg/dL      BUN 10 mg/dL      Creatinine 0.72 mg/dL      Sodium 141 mmol/L      Potassium 3.6 mmol/L      Chloride 105 mmol/L      CO2 25.0 mmol/L      Calcium 9.5 mg/dL      BUN/Creatinine Ratio 13.9     Anion Gap 11.0 mmol/L      eGFR 98.9 mL/min/1.73     Narrative:      GFR Normal >60  Chronic Kidney Disease <60  Kidney Failure <15      CBC & Differential [[id number]]  (Abnormal) Collected: 06/01/23 0728    Specimen: Blood from Arm, Left Updated: 06/01/23 0800    Narrative:      The following orders were created for panel order CBC & Differential.  Procedure                               Abnormality         Status                     ---------                               -----------         ------                     CBC Auto Differential[[id number]]        Abnormal            Final result                 Please view results for these tests on the individual orders.    CBC Auto Differential [[id number]]  (Abnormal) Collected: 06/01/23 0728    Specimen: Blood from Arm, Left Updated: 06/01/23 0800     WBC 11.30 10*3/mm3      RBC 4.79 10*6/mm3      Hemoglobin 13.7 g/dL      Hematocrit 39.2 %      MCV 81.9 fL      MCH 28.6 pg      MCHC 34.9 g/dL      RDW  14.0 %      RDW-SD 42.4 fl      MPV 9.0 fL      Platelets 219 10*3/mm3      Neutrophil % 64.1 %      Lymphocyte % 28.0 %      Monocyte % 7.5 %      Eosinophil % 0.1 %      Basophil % 0.3 %      Neutrophils, Absolute 7.20 10*3/mm3      Lymphocytes, Absolute 3.20 10*3/mm3      Monocytes, Absolute 0.90 10*3/mm3      Eosinophils, Absolute 0.00 10*3/mm3      Basophils, Absolute 0.00 10*3/mm3      nRBC 0.4 /100 WBC     CBC & Differential [[id number]]  (Abnormal) Collected: 05/31/23 1853    Specimen: Blood from Arm, Left Updated: 05/31/23 2038    Narrative:      The following orders were created for panel order CBC & Differential.  Procedure                               Abnormality         Status                     ---------                               -----------         ------                     CBC Auto Differential[[id number]]        Abnormal            Final result               Scan Slide[[id number]]                                       Final result                 Please view results for these tests on the individual orders.    Scan Slide [[id number]] Collected: 05/31/23 1853    Specimen: Blood from Arm, Left Updated: 05/31/23 2038     RBC Morphology Normal     WBC Morphology Normal     Platelet Estimate Adequate     Giant Platelets Mod/2+    CBC Auto Differential [[id number]]  (Abnormal) Collected: 05/31/23 1853    Specimen: Blood from Arm, Left Updated: 05/31/23 2038     WBC 13.90 10*3/mm3      RBC 5.09 10*6/mm3      Hemoglobin 14.5 g/dL      Hematocrit 41.8 %      MCV 82.3 fL      MCH 28.4 pg      MCHC 34.6 g/dL      RDW 13.9 %      RDW-SD 42.0 fl      MPV 9.7 fL      Platelets 175 10*3/mm3      Neutrophil % 53.9 %      Lymphocyte % 38.5 %      Monocyte % 6.3 %      Eosinophil % 0.4 %      Basophil % 0.9 %      Neutrophils, Absolute 7.50 10*3/mm3      Lymphocytes, Absolute 5.30 10*3/mm3      Monocytes, Absolute 0.90 10*3/mm3      Eosinophils, Absolute 0.10 10*3/mm3      Basophils, Absolute 0.10 10*3/mm3       nRBC 0.4 /100 WBC     Faxon Draw [[id number]] Collected: 05/31/23 1853    Specimen: Blood from Arm, Left Updated: 05/31/23 2001    Narrative:      The following orders were created for panel order Faxon Draw.  Procedure                               Abnormality         Status                     ---------                               -----------         ------                     Green Top (Gel)[[id number]]                                  Final result               Lavender Top[[id number]]                                     Final result               Gold Top - SST[[id number]]                                   Final result               Light Blue Top[[id number]]                                   Final result                 Please view results for these tests on the individual orders.    Green Top (Gel) [[id number]] Collected: 05/31/23 1853    Specimen: Blood from Arm, Left Updated: 05/31/23 2001     Extra Tube Hold for add-ons.     Comment: Auto resulted.       Lavender Top [[id number]] Collected: 05/31/23 1853    Specimen: Blood from Arm, Left Updated: 05/31/23 2001     Extra Tube hold for add-on     Comment: Auto resulted       Light Blue Top [[id number]] Collected: 05/31/23 1853    Specimen: Blood from Arm, Left Updated: 05/31/23 2001     Extra Tube Hold for add-ons.     Comment: Auto resulted       Gold Top - SST [[id number]] Collected: 05/31/23 1853    Specimen: Blood from Arm, Left Updated: 05/31/23 2001     Extra Tube Hold for add-ons.     Comment: Auto resulted.       Comprehensive Metabolic Panel [[id number]]  (Abnormal) Collected: 05/31/23 1853    Specimen: Blood from Arm, Left Updated: 05/31/23 1945     Glucose 133 mg/dL      BUN 12 mg/dL      Creatinine 0.90 mg/dL      Sodium 143 mmol/L      Potassium 3.1 mmol/L      Chloride 103 mmol/L      CO2 25.0 mmol/L      Calcium 9.8 mg/dL      Total Protein 7.2 g/dL      Albumin 4.0 g/dL      ALT (SGPT) 14 U/L      AST (SGOT) 17 U/L      Alkaline Phosphatase 62  U/L      Total Bilirubin 0.6 mg/dL      Globulin 3.2 gm/dL      A/G Ratio 1.3 g/dL      BUN/Creatinine Ratio 13.3     Anion Gap 15.0 mmol/L      eGFR 75.7 mL/min/1.73     Narrative:      GFR Normal >60  Chronic Kidney Disease <60  Kidney Failure <15            Imaging Results (Most Recent)     Procedure Component Value Units Date/Time    XR Chest 1 View [[id number]] Collected: 05/31/23 2021     Updated: 05/31/23 2024    Narrative:      XR CHEST 1 VW    Date of Exam: 5/31/2023 7:52 PM EDT    Indication: near-syncope    Comparison: None available.    Findings:    LUNGS: Left basilar opacities. No pleural effusion.  PNEUMOTHORAX: None.    HEART SIZE: Normal.         Impression:      1.Left basilar opacities may represent atelectasis or infiltrates.      Electronically Signed: Govind Blackwood    5/31/2023 8:22 PM EDT    Workstation ID: [id number]        reviewed    ECG/EMG Results (most recent)     Procedure Component Value Units Date/Time    ECG 12 Lead [[id number]]  (Abnormal) Resulted: 05/31/23 2124     Updated: 05/31/23 2124    ECG 12 Lead Drug Monitoring [[id number]] Collected: 05/31/23 1911     Updated: 06/01/23 0951     QT Interval 440 ms     Narrative:      HEART RATE= 85  bpm  RR Interval= 704  ms  SD Interval= 173  ms  P Horizontal Axis= 3  deg  P Front Axis= 50  deg  QRSD Interval= 85  ms  QT Interval= 440  ms  QRS Axis= 66  deg  T Wave Axis= 85  deg  - ABNORMAL ECG -  Sinus rhythm  Prolonged QT interval  When compared with ECG of 16-Nov-2015 17:40:55,  Significant rate increase  Significant repolarization change  Electronically Signed By: Harpal Hernandez (RHONDA) 01-Jun-2023 09:51:22  Date and Time of Study: 2023-05-31 19:11:10    ECG 12 Lead QT Measurement [[id number]] Collected: 06/01/23 1132     Updated: 06/01/23 1134     QT Interval 309 ms     Narrative:      HEART RATE= 95  bpm  RR Interval= 628  ms  SD Interval= 166  ms  P Horizontal Axis= -5  deg  P Front Axis= 54  deg  QRSD Interval= 82  ms  QT Interval= 309   ms  QRS Axis= 54  deg  T Wave Axis=   deg  - ABNORMAL ECG -  Incomplete analysis due to missing data in precordial lead(s)  Sinus rhythm  Nonspecific T abnormalities, lateral leads  When compared with ECG of 31-May-2023 19:11:10,  Significant repolarization change  Electronically Signed By:   Date and Time of Study: 2023-06-01 11:32:22        reviewed            Microbiology Results (last 10 days)     ** No results found for the last 240 hours. **          Assessment & Plan     Accidental drug ingestion     Accidental drug ingestion  -Patient accidentally took her 's dose of hydralazine and lisinopril  -Blood pressures trended down to 87/49 following her presentation but have continued to improved and most recent finding of 129/78  -Patient given 1 L fluid bolus in the ED  -Hold home hydrochlorothiazide  -EKG showed sinus rhythm at 85 with a prolonged QT interval with a QTc of 524 ms, repeat EKG showed a QTc of 444 ms  -Monitor blood pressure    Headache  -Patient given Imitrex without significant improvement  -Fioricet ordered    Anxiety/depression  -Pristiq, Elavil        I discussed the patients findings and my recommendations with patient and nursing staff.     Discharge Diagnosis:      Accidental drug ingestion      Hospital Course  Patient is a 53 y.o. female presented with some lightheadedness and dizziness after accidentally taking her 's blood pressure medication with an HPI noted above.  Blood pressures did trend down while in the ED to a low reading of 87/49.  She received a 1 L fluid bolus and her home hydrochlorothiazide was held.  Initial EKG showed sinus rhythm 85 with a prolonged QT interval of 524 ms.  This was reassessed on the morning following admission and showed a QTc of 444 ms.  Blood pressure improved to most recent finding of 129/78.  She did report some headache and was given her home Imitrex followed by Fioricet.  Resolution of headache was reported.  At this time patient is  felt to be in good condition for discharge with close follow-up with her PCP on an outpatient basis.  Her full testing/results and plan were discussed with patient along with concerning/alarm symptoms for which to call 911/return to the ED.  She is instructed to monitor and log her heart rates and blood pressures.  She is given instructions to monitor and log blood pressures heart rates to discuss with PCP at next visit maintain adequate hydration. All questions were answered and she verbalizes her understanding agreement.    Past Medical History:     Past Medical History:   Diagnosis Date   • Colitis    • Crohn disease    • Hypertension        Past Surgical History:     Past Surgical History:   Procedure Laterality Date   • COLONOSCOPY     • CYSTOSCOPY BLADDER STONE LITHOTRIPSY     • HYSTERECTOMY     • LAPAROSCOPIC TUBAL LIGATION         Social History:   Social History     Socioeconomic History   • Marital status:    Tobacco Use   • Smoking status: Former     Packs/day: 1.00     Years: 15.00     Pack years: 15.00     Types: Cigarettes   • Smokeless tobacco: Never   Vaping Use   • Vaping Use: Unknown   Substance and Sexual Activity   • Alcohol use: Not Currently   • Drug use: Defer   • Sexual activity: Defer       Procedures Performed         Consults:   Consults     No orders found for last 30 day(s).          Condition on Discharge:     Stable    Discharge Disposition      Discharge Medications     Discharge Medications      Continue These Medications      Instructions Start Date   amitriptyline 10 MG tablet  Commonly known as: ELAVIL   10 mg, Oral, Nightly      desvenlafaxine 50 MG 24 hr tablet  Commonly known as: PRISTIQ   50 mg, Oral, Daily      Gemtesa 75 MG tablet  Generic drug: Vibegron   75 mg, Oral, Daily      hydroCHLOROthiazide 25 MG tablet  Commonly known as: HYDRODIURIL   25 mg, Oral, Daily      HYDROcodone-acetaminophen 7.5-325 MG per tablet  Commonly known as: NORCO   1 tablet, Oral, Every 6  Hours PRN      potassium chloride 10 MEQ CR tablet   10 mEq, Oral, 2 Times Daily      SUMAtriptan 25 MG tablet  Commonly known as: IMITREX   25 mg, Oral, Every 2 Hours PRN, Take one tablet at onset of headache. May repeat dose one time in 2 hours if headache not relieved.             Discharge Diet:     Activity at Discharge:     Follow-up Appointments  No future appointments.  Additional Instructions for the Follow-ups that You Need to Schedule     Discharge Follow-up with PCP   As directed       Currently Documented PCP:    Shayla Haney MD    PCP Phone Number:    [phone]     Follow Up Details: 5 to 7 days               Test Results Pending at Discharge       Risk for Readmission (LACE) Score: 2 (6/1/2023  6:01 AM)      Greater than 30 minutes spent in discharge activities for this patient    Moe Yee PA-C  06/01/23  13:07 EDT

## 2023-06-01 NOTE — CASE MANAGEMENT/SOCIAL WORK
Discharge Planning Assessment   [facility]     Patient Name: Lucia Fernando  MRN: [id number]  Today's Date: 6/1/2023    Admit Date: 5/31/2023    Plan: Home with family.   Discharge Needs Assessment     Row Name 06/01/23 1316       Living Environment    People in Home child(cedrick), adult;spouse    Current Living Arrangements home    Potentially Unsafe Housing Conditions none    Primary Care Provided by self    Provides Primary Care For no one    Family Caregiver if Needed spouse    Quality of Family Relationships helpful;involved;supportive    Able to Return to Prior Arrangements yes       Resource/Environmental Concerns    Resource/Environmental Concerns none    Transportation Concerns none       Transition Planning    Patient/Family Anticipates Transition to home with family    Patient/Family Anticipated Services at Transition none    Transportation Anticipated family or friend will provide       Discharge Needs Assessment    Readmission Within the Last 30 Days no previous admission in last 30 days    Equipment Currently Used at Home none    Concerns to be Addressed no discharge needs identified;denies needs/concerns at this time    Anticipated Changes Related to Illness none    Equipment Needed After Discharge none               Discharge Plan     Row Name 06/01/23 1316       Plan    Plan Home with family.    Patient/Family in Agreement with Plan yes    Plan Comments Patient lives at home with spouse and son. Patient drives, spouse will transport at discharge. Patient performs ADL. PCP and pharmacy confirmed. Denies financial assistance needs for medication and/or food. Denies HH and/or rehab needs.    Final Discharge Disposition Code 01 - home or self-care    Final Note Home               Demographic Summary     Row Name 06/01/23 1316       General Information    Admission Type observation    Arrived From emergency department    Referral Source admission list    Reason for Consult discharge planning    Preferred  Language English               Functional Status     Row Name 06/01/23 1316       Functional Status    Usual Activity Tolerance good    Current Activity Tolerance good       Functional Status, IADL    Medications independent    Meal Preparation independent    Housekeeping independent    Laundry independent    Shopping independent              Met with patient in room.    Maintained distance greater than six feet and spent less than 15 minutes in the room.    Cristin Pantoja RN, BSN  Obs[facility]   [address] [address]  Phone: [phone]  Fax: [phone]

## 2023-06-01 NOTE — PLAN OF CARE
Goal Outcome Evaluation:  Plan of Care Reviewed With: patient        Progress: improving  Outcome Evaluation: Pt was admitted from ED for accidental drug ingestion of a BP medication. BP has remained WNL during this RNs shift. Expected afternoon discharge. Care continues

## 2023-06-01 NOTE — PLAN OF CARE
Problem: Adult Inpatient Plan of Care  Goal: Absence of Hospital-Acquired Illness or Injury  Intervention: Identify and Manage Fall Risk  Recent Flowsheet Documentation  Taken 6/1/2023 1213 by Quynh Mendoza RN  Safety Promotion/Fall Prevention: safety round/check completed  Taken 6/1/2023 1017 by Quynh Mendoza RN  Safety Promotion/Fall Prevention:   safety round/check completed   room organization consistent  Taken 6/1/2023 0800 by Quynh Mendoza RN  Safety Promotion/Fall Prevention:   safety round/check completed   room organization consistent   Goal Outcome Evaluation:  Plan of Care Reviewed With: patient        Progress: improving  Outcome Evaluation: Patient has been stable all day. Headache resolved. Will discharge home with family. No complaints at this time.

## 2023-06-02 LAB — QT INTERVAL: 309 MS

## 2024-03-18 ENCOUNTER — APPOINTMENT (OUTPATIENT)
Dept: GENERAL RADIOLOGY | Facility: HOSPITAL | Age: 54
End: 2024-03-18
Payer: COMMERCIAL

## 2024-03-18 ENCOUNTER — HOSPITAL ENCOUNTER (EMERGENCY)
Facility: HOSPITAL | Age: 54
Discharge: HOME | End: 2024-03-18
Attending: EMERGENCY MEDICINE | Admitting: EMERGENCY MEDICINE
Payer: COMMERCIAL

## 2024-03-18 VITALS
RESPIRATION RATE: 16 BRPM | SYSTOLIC BLOOD PRESSURE: 116 MMHG | DIASTOLIC BLOOD PRESSURE: 82 MMHG | OXYGEN SATURATION: 98 % | BODY MASS INDEX: 28.64 KG/M2 | TEMPERATURE: 97.6 F | WEIGHT: 155.65 LBS | HEIGHT: 62 IN | HEART RATE: 98 BPM

## 2024-03-18 DIAGNOSIS — S61.451A CAT BITE OF RIGHT HAND, INITIAL ENCOUNTER: Primary | ICD-10-CM
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
## 2024-03-18 PROCEDURE — 99283 EMERGENCY DEPT VISIT LOW MDM: CPT

## 2024-03-18 PROCEDURE — 90715 TDAP VACCINE 7 YRS/> IM: CPT | Performed by: NURSE PRACTITIONER

## 2024-03-18 PROCEDURE — 90471 IMMUNIZATION ADMIN: CPT | Performed by: NURSE PRACTITIONER

## 2024-03-18 PROCEDURE — 73110 X-RAY EXAM OF WRIST: CPT

## 2024-03-18 PROCEDURE — 25010000002 TETANUS-DIPHTH-ACELL PERTUSSIS 5-2.5-18.5 LF-MCG/0.5 SUSPENSION PREFILLED SYRINGE: Performed by: NURSE PRACTITIONER

## 2024-03-18 PROCEDURE — 73130 X-RAY EXAM OF HAND: CPT

## 2024-03-18 RX ORDER — AMOXICILLIN AND CLAVULANATE POTASSIUM 875; 125 MG/1; MG/1
1 TABLET, FILM COATED ORAL ONCE
Status: COMPLETED | OUTPATIENT
Start: 2024-03-18 | End: 2024-03-18

## 2024-03-18 RX ORDER — AMOXICILLIN AND CLAVULANATE POTASSIUM 875; 125 MG/1; MG/1
1 TABLET, FILM COATED ORAL 2 TIMES DAILY
Qty: 14 TABLET | Refills: 0 | Status: SHIPPED | OUTPATIENT
Start: 2024-03-18 | End: 2024-03-25

## 2024-03-18 RX ORDER — DIAPER,BRIEF,INFANT-TODD,DISP
1 EACH MISCELLANEOUS EVERY 12 HOURS SCHEDULED
Status: DISCONTINUED | OUTPATIENT
Start: 2024-03-18 | End: 2024-03-18 | Stop reason: HOSPADM

## 2024-03-18 RX ADMIN — AMOXICILLIN AND CLAVULANATE POTASSIUM 1 TABLET: 875; 125 TABLET, FILM COATED ORAL at 01:19

## 2024-03-18 RX ADMIN — BACITRACIN 0.9 G: 500 OINTMENT TOPICAL at 01:19

## 2024-03-18 RX ADMIN — TETANUS TOXOID, REDUCED DIPHTHERIA TOXOID AND ACELLULAR PERTUSSIS VACCINE, ADSORBED 0.5 ML: 5; 2.5; 8; 8; 2.5 SUSPENSION INTRAMUSCULAR at 01:19

## 2024-03-18 NOTE — DISCHARGE INSTRUCTIONS
Keep dressing in place, after 24 hours may change twice a day and as needed  Complete entire course of antibiotics return to the ED for any new or worsening symptoms, specifically any redness, drainage, warmth, swelling  Or concerning signs    Follow-up up with your primary care provider for recheck, call tomorrow   Former smoker

## 2024-03-18 NOTE — ED PROVIDER NOTES
Subjective   Chief Complaint   Patient presents with    Animal Bite       History of Present Illness  53-year-old male presents the ED with complaint of a cat bite to her right hand, with multiple punctures to the volar wrist, palm of the right hand, r fingers finger. Cat is her sons cat, not sick or ill. Up to date on vaccines per her knowledge. Her last tdap was 6-7 years ago.   Review of Systems   Constitutional:  Negative for chills and fever.   Skin:  Positive for wound.       Past Medical History:   Diagnosis Date    Colitis     Crohn disease     Hypertension        No Known Allergies    Past Surgical History:   Procedure Laterality Date    COLONOSCOPY      CYSTOSCOPY BLADDER STONE LITHOTRIPSY      HYSTERECTOMY      LAPAROSCOPIC TUBAL LIGATION         No family history on file.    Social History     Socioeconomic History    Marital status:    Tobacco Use    Smoking status: Former     Current packs/day: 1.00     Average packs/day: 1 pack/day for 15.0 years (15.0 ttl pk-yrs)     Types: Cigarettes    Smokeless tobacco: Never   Vaping Use    Vaping status: Unknown   Substance and Sexual Activity    Alcohol use: Not Currently    Drug use: Defer    Sexual activity: Defer           Objective   Physical Exam  Vitals and nursing note reviewed.   Constitutional:       Appearance: Normal appearance. She is not toxic-appearing.   Cardiovascular:      Pulses:           Radial pulses are 2+ on the right side and 2+ on the left side.   Pulmonary:      Effort: Pulmonary effort is normal.   Musculoskeletal:      Comments: Patient has multiple punctures, area of contusion noted to the right volar wrist.  Multiple punctures to the palm of the hand, as well as a superficial skin avulsion to the right little finger distal aspect, volar aspect.   All wounds are superficial in nature.  No palpable foreign bodies.  Extremities are pink warm and dry, appear well-perfused.  Distal pulses are intact bilaterally.   Compartments  "soft.  Cap refill brisk.  2 point discrimination intact all digits.  Sensation intact to light touch to all digits no weakness appreciated patient able to do thumb opposition normally, paper rock and scissors normally.       Skin:     General: Skin is warm and dry.      Capillary Refill: Capillary refill takes less than 2 seconds.   Neurological:      Mental Status: She is alert and oriented to person, place, and time.         Procedures           ED Course      /82   Pulse 98   Temp 97.6 °F (36.4 °C)   Resp 16   Ht 157.5 cm (62\")   Wt 70.6 kg (155 lb 10.3 oz)   SpO2 98%   BMI 28.47 kg/m²   Medications   bacitracin ointment 0.9 g (0.9 g Topical Given 3/18/24 0119)   Tetanus-Diphth-Acell Pertussis (BOOSTRIX) injection 0.5 mL (0.5 mL Intramuscular Given 3/18/24 0119)   amoxicillin-clavulanate (AUGMENTIN) 875-125 MG per tablet 1 tablet (1 tablet Oral Given 3/18/24 0119)     XR Hand 3+ View Right    Result Date: 3/18/2024  Impression: Normal right wrist and hand Electronically Signed: True Conrad MD  3/18/2024 1:30 AM EDT  Workstation ID: [id number]    XR Wrist 3+ View Right    Result Date: 3/18/2024  Impression: Normal right wrist and hand Electronically Signed: True Conrad MD  3/18/2024 1:30 AM EDT  Workstation ID: [id number]   Lab Results (last 24 hours)       ** No results found for the last 24 hours. **                                                 Medical Decision Making  Problems Addressed:  Cat bite of right hand, initial encounter: complicated acute illness or injury    Amount and/or Complexity of Data Reviewed  Radiology: ordered.    Risk  OTC drugs.  Prescription drug management.      Imaging:   XR Hand 3+ View Right    Result Date: 3/18/2024  Impression: Normal right wrist and hand Electronically Signed: True Conrad MD  3/18/2024 1:30 AM EDT  Workstation ID: [id number]    XR Wrist 3+ View Right    Result Date: 3/18/2024  Impression: Normal right wrist and hand Electronically Signed: True Conrad" MD  3/18/2024 1:30 AM EDT  Workstation ID: [id number]         Patient presents to the ED for the above complaint, underwent the above, exam and workup.  Placed on appropriate monitoring.  Differential diagnoses considered for patient presentation, this list is not all inclusive of diagnoses considered: Laceration, foreign body, contusion.  Exam as documented above.  No foreign bodies on x-ray.  Superficial punctures.  None requiring laceration repair.  Wound care provided per nursing staff.  Patient had tetanus updated.  Placed on Augmentin.  No indication for rabies, cat is known to her son.  Does not appear ill or sick  Disposition: I discussed my findings, plan of care, discharge instructions, the importance of follow up with their PCP/ and or specialist for repeat evaluation and to discuss any abnormal findings in labs or imaging that warrant further outpatient evaluation. We discussed that although a definitive diagnosis is not always found in the ED, it is believed emergent conditions have been ruled out, and patient is safe for discharge at this time.  We discussed return precautions for the emergency department.  Patient verbalizes understandings, and agrees with current plan of care.  Note Disclaimer: At [facility], we believe that sharing information builds trust and better relationships. You are receiving this note because you recently visited [facility]. It is possible you will see health information before a provider has talked with you about it. This kind of information can be easy to misunderstand. To help you fully understand what it means for your health, we urge you to discuss this note with your provider.Note dictated utilizing Dragon Dictation. Appropriate PPE worn during patient interactions.        Final diagnoses:   Cat bite of right hand, initial encounter       ED Disposition  ED Disposition       ED Disposition   Discharge    Condition   Stable    Comment   --               [place]  Shayla Shah MD  [address]  [address] [address]  [place] [address] [phone]    Schedule an appointment as soon as possible for a visit       [facility] EMERGENCY DEPARTMENT  [address] [address] [phone]             Medication List        New Prescriptions      amoxicillin-clavulanate 875-125 MG per tablet  Commonly known as: AUGMENTIN  Take 1 tablet by mouth 2 (Two) Times a Day for 7 days.               Where to Get Your Medications        These medications were sent to [id number] - [place], [place] - [address] AT [facility] OF [facility] [address] - [phone]  - [phone] FX  [address] [place] [address]      Phone: [phone]   amoxicillin-clavulanate 875-125 MG per tablet            Malu Pinon, JIM  03/18/24 0153

## 2024-03-18 NOTE — ED NOTES
Pt was playing with sons cat and the cat wrapped paws around pts right arm and now has lacerations on right forearm from scratches and a bite and also has a deep laceration on right pinky that pt could not get to stop bleeding.

## 2024-03-18 NOTE — ED NOTES
Pt arrived via [facility] c/o being bitten and scratched by her sons cat. Pt reports she was bitten at approx 2000. Pt states she believes the cat is vaccinated. Pt unsure of last tetanus but thinks it may be 6-7 years ago.